# Patient Record
Sex: FEMALE | Race: WHITE | NOT HISPANIC OR LATINO | Employment: UNEMPLOYED | URBAN - NONMETROPOLITAN AREA
[De-identification: names, ages, dates, MRNs, and addresses within clinical notes are randomized per-mention and may not be internally consistent; named-entity substitution may affect disease eponyms.]

---

## 2023-08-16 ENCOUNTER — HOSPITAL ENCOUNTER (EMERGENCY)
Facility: HOSPITAL | Age: 48
Discharge: OTHER INSTITUTION | DRG: 897 | End: 2023-08-16
Attending: EMERGENCY MEDICINE
Payer: COMMERCIAL

## 2023-08-16 ENCOUNTER — HOSPITAL ENCOUNTER (INPATIENT)
Facility: HOSPITAL | Age: 48
LOS: 5 days | Discharge: HOME | DRG: 897 | End: 2023-08-21
Attending: PSYCHIATRY & NEUROLOGY | Admitting: PSYCHIATRY & NEUROLOGY
Payer: COMMERCIAL

## 2023-08-16 VITALS
RESPIRATION RATE: 18 BRPM | DIASTOLIC BLOOD PRESSURE: 97 MMHG | HEART RATE: 86 BPM | SYSTOLIC BLOOD PRESSURE: 149 MMHG | OXYGEN SATURATION: 100 % | TEMPERATURE: 97.8 F | HEIGHT: 64 IN | WEIGHT: 140 LBS | BODY MASS INDEX: 23.9 KG/M2

## 2023-08-16 DIAGNOSIS — F11.93 HEROIN WITHDRAWAL: ICD-10-CM

## 2023-08-16 DIAGNOSIS — I10 HYPERTENSION, UNSPECIFIED TYPE: ICD-10-CM

## 2023-08-16 DIAGNOSIS — F19.10 SUBSTANCE ABUSE: Primary | ICD-10-CM

## 2023-08-16 LAB
ALBUMIN SERPL-MCNC: 3.9 G/DL (ref 3.5–5.2)
ALBUMIN/GLOB SERPL: 1 G/DL
ALP SERPL-CCNC: 100 U/L (ref 39–117)
ALT SERPL W P-5'-P-CCNC: 21 U/L (ref 1–33)
AMPHET+METHAMPHET UR QL: NEGATIVE
AMPHETAMINES UR QL: POSITIVE
ANION GAP SERPL CALCULATED.3IONS-SCNC: 14.9 MMOL/L (ref 5–15)
AST SERPL-CCNC: 26 U/L (ref 1–32)
BACTERIA UR QL AUTO: ABNORMAL /HPF
BARBITURATES UR QL SCN: NEGATIVE
BASOPHILS # BLD AUTO: 0.09 10*3/MM3 (ref 0–0.2)
BASOPHILS NFR BLD AUTO: 0.8 % (ref 0–1.5)
BENZODIAZ UR QL SCN: NEGATIVE
BILIRUB SERPL-MCNC: 0.4 MG/DL (ref 0–1.2)
BILIRUB UR QL STRIP: NEGATIVE
BUN SERPL-MCNC: 39 MG/DL (ref 6–20)
BUN/CREAT SERPL: 26.7 (ref 7–25)
BUPRENORPHINE SERPL-MCNC: NEGATIVE NG/ML
CALCIUM SPEC-SCNC: 8.8 MG/DL (ref 8.6–10.5)
CANNABINOIDS SERPL QL: NEGATIVE
CHLORIDE SERPL-SCNC: 103 MMOL/L (ref 98–107)
CLARITY UR: ABNORMAL
CO2 SERPL-SCNC: 18.1 MMOL/L (ref 22–29)
COCAINE UR QL: NEGATIVE
COLOR UR: YELLOW
CREAT SERPL-MCNC: 1.46 MG/DL (ref 0.57–1)
DEPRECATED RDW RBC AUTO: 45 FL (ref 37–54)
EGFRCR SERPLBLD CKD-EPI 2021: 43.7 ML/MIN/1.73
EOSINOPHIL # BLD AUTO: 0.16 10*3/MM3 (ref 0–0.4)
EOSINOPHIL NFR BLD AUTO: 1.3 % (ref 0.3–6.2)
ERYTHROCYTE [DISTWIDTH] IN BLOOD BY AUTOMATED COUNT: 14.8 % (ref 12.3–15.4)
ETHANOL BLD-MCNC: <10 MG/DL (ref 0–10)
ETHANOL UR QL: <0.01 %
FENTANYL UR-MCNC: POSITIVE NG/ML
FLUAV RNA RESP QL NAA+PROBE: NOT DETECTED
FLUBV RNA RESP QL NAA+PROBE: NOT DETECTED
GLOBULIN UR ELPH-MCNC: 4 GM/DL
GLUCOSE SERPL-MCNC: 109 MG/DL (ref 65–99)
GLUCOSE UR STRIP-MCNC: ABNORMAL MG/DL
HCT VFR BLD AUTO: 34 % (ref 34–46.6)
HGB BLD-MCNC: 11 G/DL (ref 12–15.9)
HGB UR QL STRIP.AUTO: NEGATIVE
HOLD SPECIMEN: NORMAL
HOLD SPECIMEN: NORMAL
HYALINE CASTS UR QL AUTO: ABNORMAL /LPF
IMM GRANULOCYTES # BLD AUTO: 0.07 10*3/MM3 (ref 0–0.05)
IMM GRANULOCYTES NFR BLD AUTO: 0.6 % (ref 0–0.5)
KETONES UR QL STRIP: NEGATIVE
LEUKOCYTE ESTERASE UR QL STRIP.AUTO: ABNORMAL
LYMPHOCYTES # BLD AUTO: 2.81 10*3/MM3 (ref 0.7–3.1)
LYMPHOCYTES NFR BLD AUTO: 23.6 % (ref 19.6–45.3)
MAGNESIUM SERPL-MCNC: 2 MG/DL (ref 1.6–2.6)
MCH RBC QN AUTO: 27.2 PG (ref 26.6–33)
MCHC RBC AUTO-ENTMCNC: 32.4 G/DL (ref 31.5–35.7)
MCV RBC AUTO: 84.2 FL (ref 79–97)
METHADONE UR QL SCN: NEGATIVE
MONOCYTES # BLD AUTO: 0.57 10*3/MM3 (ref 0.1–0.9)
MONOCYTES NFR BLD AUTO: 4.8 % (ref 5–12)
NEUTROPHILS NFR BLD AUTO: 68.9 % (ref 42.7–76)
NEUTROPHILS NFR BLD AUTO: 8.23 10*3/MM3 (ref 1.7–7)
NITRITE UR QL STRIP: NEGATIVE
NRBC BLD AUTO-RTO: 0 /100 WBC (ref 0–0.2)
OPIATES UR QL: NEGATIVE
OXYCODONE UR QL SCN: NEGATIVE
PCP UR QL SCN: NEGATIVE
PH UR STRIP.AUTO: 6.5 [PH] (ref 5–8)
PLATELET # BLD AUTO: 309 10*3/MM3 (ref 140–450)
PMV BLD AUTO: 10 FL (ref 6–12)
POTASSIUM SERPL-SCNC: 3.7 MMOL/L (ref 3.5–5.2)
PROPOXYPH UR QL: NEGATIVE
PROT SERPL-MCNC: 7.9 G/DL (ref 6–8.5)
PROT UR QL STRIP: ABNORMAL
RBC # BLD AUTO: 4.04 10*6/MM3 (ref 3.77–5.28)
RBC # UR STRIP: ABNORMAL /HPF
REF LAB TEST METHOD: ABNORMAL
SARS-COV-2 RNA RESP QL NAA+PROBE: NOT DETECTED
SODIUM SERPL-SCNC: 136 MMOL/L (ref 136–145)
SP GR UR STRIP: 1.01 (ref 1–1.03)
SQUAMOUS #/AREA URNS HPF: ABNORMAL /HPF
TRICYCLICS UR QL SCN: NEGATIVE
UROBILINOGEN UR QL STRIP: ABNORMAL
WBC # UR STRIP: ABNORMAL /HPF
WBC NRBC COR # BLD: 11.93 10*3/MM3 (ref 3.4–10.8)
WHOLE BLOOD HOLD COAG: NORMAL
WHOLE BLOOD HOLD SPECIMEN: NORMAL

## 2023-08-16 PROCEDURE — 36415 COLL VENOUS BLD VENIPUNCTURE: CPT

## 2023-08-16 PROCEDURE — 83735 ASSAY OF MAGNESIUM: CPT | Performed by: PHYSICIAN ASSISTANT

## 2023-08-16 PROCEDURE — 80053 COMPREHEN METABOLIC PANEL: CPT | Performed by: PHYSICIAN ASSISTANT

## 2023-08-16 PROCEDURE — 85025 COMPLETE CBC W/AUTO DIFF WBC: CPT | Performed by: PHYSICIAN ASSISTANT

## 2023-08-16 PROCEDURE — 93010 ELECTROCARDIOGRAM REPORT: CPT | Performed by: INTERNAL MEDICINE

## 2023-08-16 PROCEDURE — 96374 THER/PROPH/DIAG INJ IV PUSH: CPT

## 2023-08-16 PROCEDURE — 96361 HYDRATE IV INFUSION ADD-ON: CPT

## 2023-08-16 PROCEDURE — 93005 ELECTROCARDIOGRAM TRACING: CPT | Performed by: PSYCHIATRY & NEUROLOGY

## 2023-08-16 PROCEDURE — 80074 ACUTE HEPATITIS PANEL: CPT | Performed by: PSYCHIATRY & NEUROLOGY

## 2023-08-16 PROCEDURE — 25810000003 SODIUM CHLORIDE 0.9 % SOLUTION: Performed by: PHYSICIAN ASSISTANT

## 2023-08-16 PROCEDURE — 80307 DRUG TEST PRSMV CHEM ANLYZR: CPT | Performed by: PHYSICIAN ASSISTANT

## 2023-08-16 PROCEDURE — 96375 TX/PRO/DX INJ NEW DRUG ADDON: CPT

## 2023-08-16 PROCEDURE — HZ2ZZZZ DETOXIFICATION SERVICES FOR SUBSTANCE ABUSE TREATMENT: ICD-10-PCS | Performed by: PSYCHIATRY & NEUROLOGY

## 2023-08-16 PROCEDURE — 82077 ASSAY SPEC XCP UR&BREATH IA: CPT | Performed by: PHYSICIAN ASSISTANT

## 2023-08-16 PROCEDURE — 63710000001 ONDANSETRON PER 8 MG: Performed by: PSYCHIATRY & NEUROLOGY

## 2023-08-16 PROCEDURE — 87636 SARSCOV2 & INF A&B AMP PRB: CPT | Performed by: PHYSICIAN ASSISTANT

## 2023-08-16 PROCEDURE — 81001 URINALYSIS AUTO W/SCOPE: CPT | Performed by: PHYSICIAN ASSISTANT

## 2023-08-16 PROCEDURE — 25010000002 HYDRALAZINE PER 20 MG: Performed by: PHYSICIAN ASSISTANT

## 2023-08-16 PROCEDURE — 99285 EMERGENCY DEPT VISIT HI MDM: CPT

## 2023-08-16 PROCEDURE — 25010000002 LABETALOL 5 MG/ML SOLUTION: Performed by: PHYSICIAN ASSISTANT

## 2023-08-16 RX ORDER — LOPERAMIDE HYDROCHLORIDE 2 MG/1
2 CAPSULE ORAL
Status: DISCONTINUED | OUTPATIENT
Start: 2023-08-16 | End: 2023-08-21 | Stop reason: HOSPADM

## 2023-08-16 RX ORDER — TRAZODONE HYDROCHLORIDE 50 MG/1
50 TABLET ORAL NIGHTLY PRN
Status: DISCONTINUED | OUTPATIENT
Start: 2023-08-16 | End: 2023-08-17

## 2023-08-16 RX ORDER — CLONIDINE HYDROCHLORIDE 0.1 MG/1
0.2 TABLET ORAL ONCE
Status: COMPLETED | OUTPATIENT
Start: 2023-08-16 | End: 2023-08-16

## 2023-08-16 RX ORDER — FAMOTIDINE 20 MG/1
20 TABLET, FILM COATED ORAL 2 TIMES DAILY PRN
Status: DISCONTINUED | OUTPATIENT
Start: 2023-08-16 | End: 2023-08-21 | Stop reason: HOSPADM

## 2023-08-16 RX ORDER — HYDRALAZINE HYDROCHLORIDE 20 MG/ML
10 INJECTION INTRAMUSCULAR; INTRAVENOUS ONCE
Status: COMPLETED | OUTPATIENT
Start: 2023-08-16 | End: 2023-08-16

## 2023-08-16 RX ORDER — ONDANSETRON 4 MG/1
4 TABLET, FILM COATED ORAL EVERY 6 HOURS PRN
Status: DISCONTINUED | OUTPATIENT
Start: 2023-08-16 | End: 2023-08-17

## 2023-08-16 RX ORDER — LABETALOL HYDROCHLORIDE 5 MG/ML
10 INJECTION, SOLUTION INTRAVENOUS ONCE
Status: COMPLETED | OUTPATIENT
Start: 2023-08-16 | End: 2023-08-16

## 2023-08-16 RX ORDER — CLONIDINE HYDROCHLORIDE 0.1 MG/1
0.1 TABLET ORAL 3 TIMES DAILY PRN
Status: DISPENSED | OUTPATIENT
Start: 2023-08-18 | End: 2023-08-19

## 2023-08-16 RX ORDER — SODIUM CHLORIDE 0.9 % (FLUSH) 0.9 %
10 SYRINGE (ML) INJECTION AS NEEDED
Status: DISCONTINUED | OUTPATIENT
Start: 2023-08-16 | End: 2023-08-16 | Stop reason: HOSPADM

## 2023-08-16 RX ORDER — ALUMINA, MAGNESIA, AND SIMETHICONE 2400; 2400; 240 MG/30ML; MG/30ML; MG/30ML
15 SUSPENSION ORAL EVERY 6 HOURS PRN
Status: DISCONTINUED | OUTPATIENT
Start: 2023-08-16 | End: 2023-08-21 | Stop reason: HOSPADM

## 2023-08-16 RX ORDER — BENZTROPINE MESYLATE 1 MG/1
2 TABLET ORAL ONCE AS NEEDED
Status: DISCONTINUED | OUTPATIENT
Start: 2023-08-16 | End: 2023-08-21 | Stop reason: HOSPADM

## 2023-08-16 RX ORDER — ACETAMINOPHEN 325 MG/1
650 TABLET ORAL EVERY 6 HOURS PRN
Status: DISCONTINUED | OUTPATIENT
Start: 2023-08-16 | End: 2023-08-21 | Stop reason: HOSPADM

## 2023-08-16 RX ORDER — HYDRALAZINE HYDROCHLORIDE 25 MG/1
25 TABLET, FILM COATED ORAL DAILY PRN
Status: DISCONTINUED | OUTPATIENT
Start: 2023-08-16 | End: 2023-08-21 | Stop reason: HOSPADM

## 2023-08-16 RX ORDER — ECHINACEA PURPUREA EXTRACT 125 MG
2 TABLET ORAL AS NEEDED
Status: DISCONTINUED | OUTPATIENT
Start: 2023-08-16 | End: 2023-08-21 | Stop reason: HOSPADM

## 2023-08-16 RX ORDER — CLONIDINE HYDROCHLORIDE 0.1 MG/1
0.1 TABLET ORAL 4 TIMES DAILY PRN
Status: DISPENSED | OUTPATIENT
Start: 2023-08-17 | End: 2023-08-18

## 2023-08-16 RX ORDER — CLONIDINE HYDROCHLORIDE 0.1 MG/1
0.1 TABLET ORAL 4 TIMES DAILY PRN
Status: DISPENSED | OUTPATIENT
Start: 2023-08-16 | End: 2023-08-17

## 2023-08-16 RX ORDER — BENZTROPINE MESYLATE 1 MG/ML
1 INJECTION INTRAMUSCULAR; INTRAVENOUS ONCE AS NEEDED
Status: DISCONTINUED | OUTPATIENT
Start: 2023-08-16 | End: 2023-08-21 | Stop reason: HOSPADM

## 2023-08-16 RX ORDER — HYDROXYZINE 50 MG/1
50 TABLET, FILM COATED ORAL EVERY 6 HOURS PRN
Status: DISCONTINUED | OUTPATIENT
Start: 2023-08-16 | End: 2023-08-17

## 2023-08-16 RX ORDER — ROPINIROLE 0.25 MG/1
0.5 TABLET, FILM COATED ORAL ONCE
Status: COMPLETED | OUTPATIENT
Start: 2023-08-16 | End: 2023-08-16

## 2023-08-16 RX ORDER — DICYCLOMINE HYDROCHLORIDE 10 MG/1
10 CAPSULE ORAL 3 TIMES DAILY PRN
Status: DISCONTINUED | OUTPATIENT
Start: 2023-08-16 | End: 2023-08-21 | Stop reason: HOSPADM

## 2023-08-16 RX ORDER — CLONIDINE HYDROCHLORIDE 0.1 MG/1
0.1 TABLET ORAL ONCE AS NEEDED
Status: ACTIVE | OUTPATIENT
Start: 2023-08-20 | End: 2023-08-21

## 2023-08-16 RX ORDER — IBUPROFEN 400 MG/1
400 TABLET ORAL EVERY 6 HOURS PRN
Status: DISCONTINUED | OUTPATIENT
Start: 2023-08-16 | End: 2023-08-16

## 2023-08-16 RX ORDER — CYCLOBENZAPRINE HCL 10 MG
10 TABLET ORAL 3 TIMES DAILY PRN
Status: DISCONTINUED | OUTPATIENT
Start: 2023-08-16 | End: 2023-08-21 | Stop reason: HOSPADM

## 2023-08-16 RX ORDER — BENZONATATE 100 MG/1
100 CAPSULE ORAL 3 TIMES DAILY PRN
Status: DISCONTINUED | OUTPATIENT
Start: 2023-08-16 | End: 2023-08-21 | Stop reason: HOSPADM

## 2023-08-16 RX ORDER — CLONIDINE HYDROCHLORIDE 0.1 MG/1
0.1 TABLET ORAL 2 TIMES DAILY PRN
Status: DISPENSED | OUTPATIENT
Start: 2023-08-19 | End: 2023-08-20

## 2023-08-16 RX ADMIN — CLONIDINE HYDROCHLORIDE 0.1 MG: 0.1 TABLET ORAL at 17:09

## 2023-08-16 RX ADMIN — SODIUM CHLORIDE 2000 ML: 9 INJECTION, SOLUTION INTRAVENOUS at 11:51

## 2023-08-16 RX ADMIN — HYDROXYZINE HYDROCHLORIDE 50 MG: 50 TABLET ORAL at 17:10

## 2023-08-16 RX ADMIN — ACETAMINOPHEN 650 MG: 325 TABLET ORAL at 21:01

## 2023-08-16 RX ADMIN — ROPINIROLE HYDROCHLORIDE 0.5 MG: 0.25 TABLET, FILM COATED ORAL at 21:17

## 2023-08-16 RX ADMIN — HYDRALAZINE HYDROCHLORIDE 25 MG: 25 TABLET, FILM COATED ORAL at 19:02

## 2023-08-16 RX ADMIN — CYCLOBENZAPRINE 10 MG: 10 TABLET, FILM COATED ORAL at 17:09

## 2023-08-16 RX ADMIN — CLONIDINE HYDROCHLORIDE 0.2 MG: 0.1 TABLET ORAL at 10:34

## 2023-08-16 RX ADMIN — LABETALOL HYDROCHLORIDE 10 MG: 5 INJECTION, SOLUTION INTRAVENOUS at 14:34

## 2023-08-16 RX ADMIN — HYDRALAZINE HYDROCHLORIDE 10 MG: 20 INJECTION INTRAMUSCULAR; INTRAVENOUS at 13:16

## 2023-08-16 RX ADMIN — ONDANSETRON HYDROCHLORIDE 4 MG: 4 TABLET, FILM COATED ORAL at 17:10

## 2023-08-16 NOTE — NURSING NOTE
Pt presents to intake requesting help to detox from heroin.  Pt reports taking 3-4 points daily for the last 3 years, snort or IV, but reports a long history of opiate use, moving from oxycontin, to opana, and eventually heroin. Last use yesterday. Rates current craving 10/10. Current COWS score 11.   Pt denies SI/HI/AVH  Clonidine administered per PA order for HTN and withdrawal symptoms.

## 2023-08-16 NOTE — NURSING NOTE
Patient reports she is here to detox from heroin. Reports using 3 points a day IV for the past 3 years, last use reported as 2 days ago. Reports she sometimes snorts but mainly IV use. Reports she has had consistant opiate use for the past 15-20 years. Reports no real period of sobriety. Patient reports she plans to go to rehab at [facility] on [place]. Nikienlty denies any other substance use. Denies SI HI or AVH. Reports anxiety and depression both 10/10/ Craving 10/10. Reports good appetite and poor sleep. Reports medical history of COPD and prolapsed rectum. Reports she has has a prolapsed rectum for the past 2 years due to methadone use causing constipation In the past. Reports incontinent mainly at night.

## 2023-08-16 NOTE — NURSING NOTE
Attempted to call Dr. DREA Bustillo regarding /110 P 91; no answer. Gave PRN hydralazine 25mg and sent provider text message. Will continue to monitor.

## 2023-08-16 NOTE — NURSING NOTE
Pt arrived to intake. Search complete with 2 staff members present. Belongings taken, placed in labeled bag, and placed in cabinet for safety. Paper scrubs given and pt is wearing at this time. Urine and covid swab obtained and sent to lab.

## 2023-08-16 NOTE — NURSING NOTE
Pt medically clear and return to intake. Spoke with Dr. Covington regarding pt assessment and history. Orders received to admit to detox, routine orders sp4 with clonidine detox.  Maria D Shelby RN notified. bed 46A assigned

## 2023-08-16 NOTE — ED PROVIDER NOTES
Subjective   History of Present Illness  48-year-old female who presents to the ED today for detox evaluation.  She reports that she needs detox from heroin.  She states her last use was about 3 days ago.  She states she also uses methamphetamine.  She occasionally drinks alcohol but denies any recent use.  She denies any suicidal ideations.  She states a couple days ago her mom gave her a nerve pill to try to help with her withdrawal symptoms.  She states currently she feels anxious and has not been sleeping.  She states she is having palpitations, abdominal pain, fatigue and her legs hurt.  She states she is not currently on any medications but she is supposed to take medication for her blood pressure.  She states she has been off of this for quite a while.    History provided by:  Patient  Drug / Alcohol Assessment  Primary symptoms comment: requesting detox. This is a new problem. The current episode started 2 days ago. The problem has been gradually worsening. Suspected agents include heroin and methamphetamines. Pertinent negatives include no nausea and no vomiting. Associated medical issues include addiction treatment.     Review of Systems   Constitutional:  Positive for fatigue.   HENT: Negative.     Eyes: Negative.    Respiratory: Negative.     Cardiovascular:  Positive for palpitations.   Gastrointestinal:  Positive for abdominal pain. Negative for nausea and vomiting.   Genitourinary: Negative.    Musculoskeletal:  Positive for myalgias.   Skin: Negative.    Neurological: Negative.    Psychiatric/Behavioral:  Positive for sleep disturbance. The patient is nervous/anxious.    All other systems reviewed and are negative.    No past medical history on file.    No Known Allergies    No past surgical history on file.    No family history on file.    Social History     Socioeconomic History    Marital status: Legally            Objective   Physical Exam  Vitals and nursing note reviewed.    Constitutional:       General: She is not in acute distress.     Appearance: Normal appearance. She is not diaphoretic.   HENT:      Head: Normocephalic and atraumatic.      Right Ear: External ear normal.      Left Ear: External ear normal.      Nose: Nose normal.   Eyes:      Conjunctiva/sclera: Conjunctivae normal.      Pupils: Pupils are equal, round, and reactive to light.   Cardiovascular:      Rate and Rhythm: Normal rate and regular rhythm.      Pulses: Normal pulses.      Heart sounds: Normal heart sounds.   Pulmonary:      Effort: Pulmonary effort is normal.      Breath sounds: Normal breath sounds.   Abdominal:      General: Bowel sounds are normal.      Palpations: Abdomen is soft.   Musculoskeletal:         General: Normal range of motion.      Cervical back: Normal range of motion and neck supple.   Skin:     General: Skin is warm and dry.      Capillary Refill: Capillary refill takes less than 2 seconds.   Neurological:      General: No focal deficit present.      Mental Status: She is alert and oriented to person, place, and time.   Psychiatric:         Mood and Affect: Mood is anxious.         Thought Content: Thought content does not include homicidal or suicidal ideation.       Procedures       Results for orders placed or performed during the hospital encounter of 08/16/23   COVID-19 and FLU A/B PCR - Swab, Nasopharynx    Specimen: Nasopharynx; Swab   Result Value Ref Range    COVID19 Not Detected Not Detected - Ref. Range    Influenza A PCR Not Detected Not Detected    Influenza B PCR Not Detected Not Detected   Comprehensive Metabolic Panel    Specimen: Arm, Left; Blood   Result Value Ref Range    Glucose 109 (H) 65 - 99 mg/dL    BUN 39 (H) 6 - 20 mg/dL    Creatinine 1.46 (H) 0.57 - 1.00 mg/dL    Sodium 136 136 - 145 mmol/L    Potassium 3.7 3.5 - 5.2 mmol/L    Chloride 103 98 - 107 mmol/L    CO2 18.1 (L) 22.0 - 29.0 mmol/L    Calcium 8.8 8.6 - 10.5 mg/dL    Total Protein 7.9 6.0 - 8.5 g/dL     Albumin 3.9 3.5 - 5.2 g/dL    ALT (SGPT) 21 1 - 33 U/L    AST (SGOT) 26 1 - 32 U/L    Alkaline Phosphatase 100 39 - 117 U/L    Total Bilirubin 0.4 0.0 - 1.2 mg/dL    Globulin 4.0 gm/dL    A/G Ratio 1.0 g/dL    BUN/Creatinine Ratio 26.7 (H) 7.0 - 25.0    Anion Gap 14.9 5.0 - 15.0 mmol/L    eGFR 43.7 (L) >60.0 mL/min/1.73   Urinalysis With Microscopic If Indicated (No Culture) - Urine, Clean Catch    Specimen: Urine, Clean Catch   Result Value Ref Range    Color, UA Yellow Yellow, Straw    Appearance, UA Cloudy (A) Clear    pH, UA 6.5 5.0 - 8.0    Specific Gravity, UA 1.006 1.005 - 1.030    Glucose,  mg/dL (1+) (A) Negative    Ketones, UA Negative Negative    Bilirubin, UA Negative Negative    Blood, UA Negative Negative    Protein, UA Trace (A) Negative    Leuk Esterase, UA Trace (A) Negative    Nitrite, UA Negative Negative    Urobilinogen, UA 0.2 E.U./dL 0.2 - 1.0 E.U./dL   Ethanol    Specimen: Arm, Left; Blood   Result Value Ref Range    Ethanol <10 0 - 10 mg/dL    Ethanol % <0.010 %   Urine Drug Screen - Urine, Clean Catch    Specimen: Urine, Clean Catch   Result Value Ref Range    THC, Screen, Urine Negative Negative    Phencyclidine (PCP), Urine Negative Negative    Cocaine Screen, Urine Negative Negative    Methamphetamine, Ur Positive (A) Negative    Opiate Screen Negative Negative    Amphetamine Screen, Urine Negative Negative    Benzodiazepine Screen, Urine Negative Negative    Tricyclic Antidepressants Screen Negative Negative    Methadone Screen, Urine Negative Negative    Barbiturates Screen, Urine Negative Negative    Oxycodone Screen, Urine Negative Negative    Propoxyphene Screen Negative Negative    Buprenorphine, Screen, Urine Negative Negative   Magnesium    Specimen: Arm, Left; Blood   Result Value Ref Range    Magnesium 2.0 1.6 - 2.6 mg/dL   CBC Auto Differential    Specimen: Arm, Left; Blood   Result Value Ref Range    WBC 11.93 (H) 3.40 - 10.80 10*3/mm3    RBC 4.04 3.77 - 5.28 10*6/mm3     Hemoglobin 11.0 (L) 12.0 - 15.9 g/dL    Hematocrit 34.0 34.0 - 46.6 %    MCV 84.2 79.0 - 97.0 fL    MCH 27.2 26.6 - 33.0 pg    MCHC 32.4 31.5 - 35.7 g/dL    RDW 14.8 12.3 - 15.4 %    RDW-SD 45.0 37.0 - 54.0 fl    MPV 10.0 6.0 - 12.0 fL    Platelets 309 140 - 450 10*3/mm3    Neutrophil % 68.9 42.7 - 76.0 %    Lymphocyte % 23.6 19.6 - 45.3 %    Monocyte % 4.8 (L) 5.0 - 12.0 %    Eosinophil % 1.3 0.3 - 6.2 %    Basophil % 0.8 0.0 - 1.5 %    Immature Grans % 0.6 (H) 0.0 - 0.5 %    Neutrophils, Absolute 8.23 (H) 1.70 - 7.00 10*3/mm3    Lymphocytes, Absolute 2.81 0.70 - 3.10 10*3/mm3    Monocytes, Absolute 0.57 0.10 - 0.90 10*3/mm3    Eosinophils, Absolute 0.16 0.00 - 0.40 10*3/mm3    Basophils, Absolute 0.09 0.00 - 0.20 10*3/mm3    Immature Grans, Absolute 0.07 (H) 0.00 - 0.05 10*3/mm3    nRBC 0.0 0.0 - 0.2 /100 WBC   Fentanyl, Urine - Urine, Clean Catch    Specimen: Urine, Clean Catch   Result Value Ref Range    Fentanyl, Urine Positive (A) Negative   Urinalysis, Microscopic Only - Urine, Clean Catch    Specimen: Urine, Clean Catch   Result Value Ref Range    RBC, UA 0-2 None Seen, 0-2 /HPF    WBC, UA 3-5 (A) None Seen, 0-2 /HPF    Bacteria, UA None Seen None Seen /HPF    Squamous Epithelial Cells, UA 0-2 None Seen, 0-2 /HPF    Hyaline Casts, UA None Seen None Seen /LPF    Methodology Automated Microscopy    Green Top (Gel)   Result Value Ref Range    Extra Tube Hold for add-ons.    Lavender Top   Result Value Ref Range    Extra Tube hold for add-on    Gold Top - SST   Result Value Ref Range    Extra Tube Hold for add-ons.    Light Blue Top   Result Value Ref Range    Extra Tube Hold for add-ons.           ED Course  ED Course as of 08/16/23 1638   Wed Aug 16, 2023   1521 Medically clear for detox [AH]      ED Course User Index  [AH] Carin Vogel, PA                                           Medical Decision Making  48-year-old female who presents to the ED today for detox evaluation.  She reports taking detox from  heroin.  She is having active withdrawal symptoms.  Her blood pressure was elevated during her ED stay.  She received several different medications prior to getting her blood pressure to a more acceptable level.  She also had some elevation of her BUN and creatinine.  She received IV fluids to help with this.  She was medically cleared for a detox evaluation.  Psychiatry was consulted who recommended inpatient admission.    Problems Addressed:  Heroin withdrawal: complicated acute illness or injury  Hypertension, unspecified type: complicated acute illness or injury  Substance abuse: complicated acute illness or injury    Amount and/or Complexity of Data Reviewed  Labs: ordered.    Risk  Prescription drug management.  Decision regarding hospitalization.        Final diagnoses:   Substance abuse   Heroin withdrawal   Hypertension, unspecified type       ED Disposition  ED Disposition       ED Disposition   DC/Transfer to Behavioral Health Condition   Stable    Comment   --               No follow-up provider specified.       Medication List      No changes were made to your prescriptions during this visit.            Carin Vogel PA  08/16/23 4853

## 2023-08-16 NOTE — PLAN OF CARE
Problem: Adult Behavioral Health Plan of Care  Goal: Plan of Care Review  Outcome: Ongoing, Progressing  Flowsheets (Taken 8/16/2023 2601)  Progress: no change  Plan of Care Reviewed With: patient  Patient Agreement with Plan of Care: agrees  Outcome Evaluation: new admit   Goal Outcome Evaluation:  Plan of Care Reviewed With: patient  Patient Agreement with Plan of Care: agrees     Progress: no change  Outcome Evaluation: new admit

## 2023-08-17 PROBLEM — F15.20 METHAMPHETAMINE USE DISORDER, SEVERE, DEPENDENCE: Status: ACTIVE | Noted: 2023-08-17

## 2023-08-17 PROBLEM — I10 HTN (HYPERTENSION): Status: ACTIVE | Noted: 2023-08-17

## 2023-08-17 PROBLEM — F17.200 NICOTINE USE DISORDER: Status: ACTIVE | Noted: 2023-08-17

## 2023-08-17 PROBLEM — F11.20 OPIOID USE DISORDER, SEVERE, DEPENDENCE: Status: ACTIVE | Noted: 2023-08-17

## 2023-08-17 LAB
ALBUMIN SERPL-MCNC: 3.6 G/DL (ref 3.5–5.2)
ALBUMIN/GLOB SERPL: 0.9 G/DL
ALP SERPL-CCNC: 96 U/L (ref 39–117)
ALT SERPL W P-5'-P-CCNC: 20 U/L (ref 1–33)
ANION GAP SERPL CALCULATED.3IONS-SCNC: 8.1 MMOL/L (ref 5–15)
AST SERPL-CCNC: 27 U/L (ref 1–32)
BILIRUB SERPL-MCNC: 0.3 MG/DL (ref 0–1.2)
BUN SERPL-MCNC: 32 MG/DL (ref 6–20)
BUN/CREAT SERPL: 24.1 (ref 7–25)
CALCIUM SPEC-SCNC: 8.8 MG/DL (ref 8.6–10.5)
CHLORIDE SERPL-SCNC: 106 MMOL/L (ref 98–107)
CO2 SERPL-SCNC: 20.9 MMOL/L (ref 22–29)
CREAT SERPL-MCNC: 1.33 MG/DL (ref 0.57–1)
EGFRCR SERPLBLD CKD-EPI 2021: 48.8 ML/MIN/1.73
GLOBULIN UR ELPH-MCNC: 3.9 GM/DL
GLUCOSE SERPL-MCNC: 102 MG/DL (ref 65–99)
POTASSIUM SERPL-SCNC: 4.6 MMOL/L (ref 3.5–5.2)
PROT SERPL-MCNC: 7.5 G/DL (ref 6–8.5)
QT INTERVAL: 422 MS
QTC INTERVAL: 516 MS
SODIUM SERPL-SCNC: 135 MMOL/L (ref 136–145)

## 2023-08-17 PROCEDURE — 80053 COMPREHEN METABOLIC PANEL: CPT | Performed by: PSYCHIATRY & NEUROLOGY

## 2023-08-17 PROCEDURE — 99223 1ST HOSP IP/OBS HIGH 75: CPT | Performed by: PSYCHIATRY & NEUROLOGY

## 2023-08-17 PROCEDURE — 63710000001 ONDANSETRON PER 8 MG: Performed by: PSYCHIATRY & NEUROLOGY

## 2023-08-17 RX ORDER — AMLODIPINE BESYLATE 5 MG/1
5 TABLET ORAL
Status: DISCONTINUED | OUTPATIENT
Start: 2023-08-17 | End: 2023-08-18

## 2023-08-17 RX ORDER — IPRATROPIUM BROMIDE AND ALBUTEROL SULFATE 2.5; .5 MG/3ML; MG/3ML
3 SOLUTION RESPIRATORY (INHALATION) EVERY 4 HOURS PRN
Status: DISCONTINUED | OUTPATIENT
Start: 2023-08-17 | End: 2023-08-21

## 2023-08-17 RX ADMIN — CYCLOBENZAPRINE 10 MG: 10 TABLET, FILM COATED ORAL at 22:32

## 2023-08-17 RX ADMIN — CLONIDINE HYDROCHLORIDE 0.1 MG: 0.1 TABLET ORAL at 20:59

## 2023-08-17 RX ADMIN — CLONIDINE HYDROCHLORIDE 0.1 MG: 0.1 TABLET ORAL at 08:09

## 2023-08-17 RX ADMIN — CLONIDINE HYDROCHLORIDE 0.1 MG: 0.1 TABLET ORAL at 00:15

## 2023-08-17 RX ADMIN — CLONIDINE HYDROCHLORIDE 0.1 MG: 0.1 TABLET ORAL at 14:50

## 2023-08-17 RX ADMIN — LOPERAMIDE HYDROCHLORIDE 2 MG: 2 CAPSULE ORAL at 14:50

## 2023-08-17 RX ADMIN — LOPERAMIDE HYDROCHLORIDE 2 MG: 2 CAPSULE ORAL at 08:10

## 2023-08-17 RX ADMIN — ACETAMINOPHEN 650 MG: 325 TABLET ORAL at 20:59

## 2023-08-17 RX ADMIN — CYCLOBENZAPRINE 10 MG: 10 TABLET, FILM COATED ORAL at 08:10

## 2023-08-17 RX ADMIN — HYDRALAZINE HYDROCHLORIDE 25 MG: 25 TABLET, FILM COATED ORAL at 11:42

## 2023-08-17 RX ADMIN — ACETAMINOPHEN 650 MG: 325 TABLET ORAL at 14:52

## 2023-08-17 RX ADMIN — AMLODIPINE BESYLATE 5 MG: 5 TABLET ORAL at 17:04

## 2023-08-17 RX ADMIN — ONDANSETRON HYDROCHLORIDE 4 MG: 4 TABLET, FILM COATED ORAL at 08:10

## 2023-08-17 RX ADMIN — HYDROXYZINE HYDROCHLORIDE 50 MG: 50 TABLET ORAL at 08:10

## 2023-08-17 RX ADMIN — METOPROLOL TARTRATE 50 MG: 25 TABLET, FILM COATED ORAL at 22:32

## 2023-08-17 NOTE — NURSING NOTE
Dr. Stewart made aware of pts /128. No new orders at this time, med nurse medicated pt for elevated BP. Pt denies any issues at this time.

## 2023-08-17 NOTE — PROGRESS NOTES
Navigator is helping with the following referral:    [facility] - [phone]  -Received call from [facility] checking on patient. Attempted to return call but had to leave message with Aleksandr.  8/17

## 2023-08-17 NOTE — PLAN OF CARE
Goal Outcome Evaluation:        Problem: Adult Behavioral Health Plan of Care  Goal: Patient-Specific Goal (Individualization)  Outcome: Ongoing, Progressing  Flowsheets  Taken 8/17/2023 1040  Patient-Specific Goals (Include Timeframe): Identify 2-3 coping skills, address relapse prevention methods, complete aftercare plans, and deny SI/Hi prior to discharge.  Individualized Care Needs: Therapist to offer 1-4 therapy sessions, aftercare planning, safety planning, family education, group therapy, and brief CBT/MI interventions.  Anxieties, Fears or Concerns: none verbalized  Taken 8/17/2023 1028  Patient Personal Strengths:   resilient   resourceful   motivated for treatment   motivated for recovery  Patient Vulnerabilities:   substance abuse/addiction   poor impulse control   poor physical health   occupational insecurity  Goal: Optimized Coping Skills in Response to Life Stressors  Outcome: Ongoing, Progressing  Flowsheets (Taken 8/17/2023 1040)  Optimized Coping Skills in Response to Life Stressors: making progress toward outcome  Intervention: Promote Effective Coping Strategies  Flowsheets (Taken 8/17/2023 1040)  Supportive Measures:   active listening utilized   counseling provided   decision-making supported   goal-setting facilitated   self-responsibility promoted   verbalization of feelings encouraged   positive reinforcement provided   self-reflection promoted   self-care encouraged  Goal: Develops/Participates in Therapeutic Miller to Support Successful Transition  Outcome: Ongoing, Progressing  Flowsheets (Taken 8/17/2023 1040)  Develops/Participates in Therapeutic Miller to Support Successful Transition: making progress toward outcome  Intervention: Foster Therapeutic Miller  Flowsheets (Taken 8/17/2023 1040)  Trust Relationship/Rapport:   care explained   questions encouraged   choices provided   reassurance provided   emotional support provided   thoughts/feelings acknowledged   empathic  listening provided   questions answered  Intervention: Mutually Develop Transition Plan  Flowsheets  Taken 8/17/2023 1040  Outpatient/Agency/Support Group Needs: residential services  Transition Support:   follow-up care discussed   follow-up care coordinated   community resources reviewed   crisis management plan promoted   crisis management plan verbalized  Anticipated Discharge Disposition: residential substance use unit  Taken 8/17/2023 1038  Discharge Coordination/Progress: Patient does not have insurance coverage. Therapist met with patient to complete assessment, patient agreeable.  Transportation Anticipated: agency  Transportation Concerns: none  Current Discharge Risk: substance use/abuse  Concerns to be Addressed:   substance/tobacco abuse/use   cognitive/perceptual   coping/stress   mental health   discharge planning  Readmission Within the Last 30 Days: no previous admission in last 30 days  Patient/Family Anticipated Services at Transition:   mental health services   rehabilitation services  Patient's Choice of Community Agency(s): [facility]  Patient/Family Anticipates Transition to: inpatient rehabilitation facility  Offered/Gave Vendor List: no         DATA:      Therapist met individually with patient this date to introduce role and to discuss hospitalization expectations. Patient agreeable.     Patient signed consent for [email or web address] [employer].     Clinical Maneuvering/Intervention:     Therapist assisted patient in processing above session content; acknowledged and normalized patient’s thoughts, feelings, and concerns.  Discussed the therapist/patient relationship and explain the parameters and limitations of relative confidentiality.  Also discussed the importance of active participation, and honesty to the treatment process.  Encouraged the patient to discuss/vent their feelings, frustrations, and fears concerning their ongoing medical issues and validated their feelings.      Discussed the importance of finding enjoyable activities and coping skills that the patient can engage in a regular basis. Discussed healthy coping skills such as distraction, self love, grounding, thought challenges/reframing, etc.  Provided patient with list of healthy coping skills this date. Discussed the importance of medication compliance.  Praised the patient for seeking help and spent the majority of the session building rapport.       Allowed patient to freely discuss issues without interruption or judgment. Provided safe, confidential environment to facilitate the development of positive therapeutic relationship and encourage open, honest communication.      Therapist addressed discharge safety planning this date. Assisted patient in identifying risk factors which would indicate the need for higher level of care after discharge;  including thoughts to harm self or others and/or self-harming behavior. Encouraged patient to call 911, or present to the nearest emergency room should any of these events occur. Discussed crisis intervention services and means to access.  Encouraged securing any objects of harm.       Therapist completed integrated summary, treatment plan, and initiated social history this date.  Therapist is strongly encouraging family involvement in treatment.       ASSESSMENT:      The patient is a 47 y/o  female admitted for detox treatment. Therapist met with patient on this date to complete assessment. Patient reports high anxiety and depression, denies current SI/HI/AVH. Patient reports experiencing weakness, leg cramps, restlessness, chills, and sweats. Patient reports first use at 13 y/o and has no significant periods of sobriety. Patient reports use of heroin, methamphetamine, fentanyl, and xanax. No past [facility] admissions. Patient is currently homeless. Patient was brought in by [facility] and plans to return at discharge, agency to provide  transportation pending acceptance. Patient did not consent to family involvement in treatment at this time.       PLAN:       Patient to remain hospitalized this date.     Treatment team will focus efforts on stabilizing patient's acute symptoms while providing education on healthy coping and crisis management to reduce hospitalizations.   Patient requires daily psychiatrist evaluation and 24/7 nursing supervision to promote patient  safety.     Therapist will offer 1-4 individual sessions, 1 therapy group daily, family education, and appropriate referral.    Therapist recommends [facility] residential rehab.

## 2023-08-17 NOTE — NURSING NOTE
Dr. Bustillo contacted related to patient having increased muscle jerking and restless legs.     New order given:    Reqip 0.5mg PO once and report off to day for continuity of care.

## 2023-08-17 NOTE — H&P
INITIAL PSYCHIATRIC HISTORY & PHYSICAL    Patient Identification:  Name:   Dakota Holloway  Age:   48 y.o.  Sex:   female  :   1975  MRN:   [id number]  Visit Number:   [id number]  Primary Care Physician:   Provider, No Known    SUBJECTIVE    CC/Focus of Exam: Detox    HPI: Dakota Holloway is a 48 y.o. female who was admitted on 2023 with complaints of drug use and withdrawals. The patient reports a long history of substance use. First use was 13 years old. Over time the use increased and the patient  continued to use despite negative consequences. The patient endorses symptoms of tolerance and withdrawals. Has tried to cut down and stop but has not been successful. Spends too much time and resources in pursuit of substance use. Longest period of sobriety is reported to be never.  Currently using heroin, meth, fentanyl, Xanax, marijuana  Last use 08-  Withdrawal symptoms restlessness, chills, sweats  Patient denies any alcohol abuse.  Patient states that she uses tobacco.  Patient denies any history of seizures with withdrawal.  Patient denies any stressors in her life.  Patient denies any history of mental ,physical, or sexual abuse.  Patient rates her appetite as poor.  Patient rates her sleep as poor.  Patient denies any nightmares.  Patient rates her anxiety on a scale of 1-10 with 10 being the most severe a 10.  Patient rates her depression on a scale of 1-10 with 10 being the most severe a 10.  Patient rates her cravings on a scale of 1-10 with 10 being the most severe a 10.  Patient's COWS was 11.  Patient denies any suicidal ideation.  Patient denies any homicidal ideation.  Patient denies any hallucinations.  Patient was admitted to [facility] psychiatry for further safety and stabilization.    Available medical/psychiatric records reviewed and incorporated into the current document.     PAST PSYCHIATRIC HX: Patient has had no prior admissions.  Patient denies any outpatient  care.    SUBSTANCE USE HX: UDS was positive for fentanyl and methamphetamine.  See HPI for current use.    SOCIAL HX: Patient states she was born and raised in [place], [place].  Patient states that she is currently homeless.  Patient states that she is  but states she is currently  at this time.  Patient states she has no children.  Patient states that she is currently unemployed.  Patient states she has 1/11 grade education.  Patient denies any legal issues.    Past Medical History:   Diagnosis Date    Peripheral neuropathy     Withdrawal symptoms, drug or narcotic        History reviewed. No pertinent surgical history.    History reviewed. No pertinent family history.      No medications prior to admission.           ALLERGIES:  Patient has no known allergies.    Temp:  [96.4 °F (35.8 °C)-97.8 °F (36.6 °C)] 97.4 °F (36.3 °C)  Heart Rate:  [] 113  Resp:  [16-18] 18  BP: (144-188)/() 153/101    REVIEW OF SYSTEMS:  Review of Systems   Constitutional:  Positive for chills, diaphoresis and fatigue.   HENT:  Positive for sneezing.    Eyes: Negative.    Respiratory: Negative.     Cardiovascular: Negative.    Gastrointestinal:  Positive for abdominal pain.   Endocrine: Negative.    Genitourinary: Negative.    Musculoskeletal:  Positive for myalgias.   Skin: Negative.    Allergic/Immunologic: Negative.    Neurological:  Positive for weakness.   Psychiatric/Behavioral:  Positive for dysphoric mood. The patient is nervous/anxious.     See HPI for psychiatric ROS  OBJECTIVE    PHYSICAL EXAM:  Physical Exam  Constitutional:  Appears well-developed and well-nourished.   HENT:   Head: Normocephalic and atraumatic.   Right Ear: External ear normal.   Left Ear: External ear normal.   Mouth/Throat: Oropharynx is clear and moist.   Eyes: Pupils are equal, round, and reactive to light. Conjunctivae and EOM are normal.   Neck: Normal range of motion. Neck supple.   Cardiovascular: Normal rate,  regular rhythm and normal heart sounds.    Respiratory: Effort normal and breath sounds normal. No respiratory distress. No wheezes.   GI: Soft. Bowel sounds are normal.No distension. There is no tenderness.   Musculoskeletal: Normal range of motion. No edema or deformity.   Neurological:No cranial nerve deficit. Coordination normal.   Skin: Skin is warm and dry. No rash noted. No erythema. Track marks noted both forearms.    MENTAL STATUS EXAM:   Hygiene:   fair  Cooperation:  Cooperative  Eye Contact:  Good  Psychomotor Behavior:  Aggitated  Affect:  Appropriate  Hopelessness: 5  Speech:  Normal  Linear  Thought Content:  Normal  Suicidal:  None  Homicidal:  None  Hallucinations:  None  Delusion:  None  Memory:  Intact  Orientation:  Person, Place, Time, and Situation  Reliability:  fair  Insight:  Fair  Judgement:  Poor  Impulse Control:  Poor      Imaging Results (Last 24 Hours)       ** No results found for the last 24 hours. **             ECG/EMG Results (most recent)       Procedure Component Value Units Date/Time    ECG 12 Lead Other; Baseline Cardiac Status [[id number]] Collected: 08/16/23 1752     Updated: 08/16/23 1753     QT Interval 422 ms      QTC Interval 516 ms     Narrative:      Test Reason : Other~  Blood Pressure :   */*   mmHG  Vent. Rate :  90 BPM     Atrial Rate :  90 BPM     P-R Int : 176 ms          QRS Dur :  84 ms      QT Int : 422 ms       P-R-T Axes :  71  65  95 degrees     QTc Int : 516 ms    Normal sinus rhythm  Voltage criteria for left ventricular hypertrophy  Nonspecific T wave abnormality  Prolonged QT  Abnormal ECG  No previous ECGs available    Referred By: NUBIA           Confirmed By:              Lab Results   Component Value Date    GLUCOSE 102 (H) 08/17/2023    BUN 32 (H) 08/17/2023    CREATININE 1.33 (H) 08/17/2023    BCR 24.1 08/17/2023    CO2 20.9 (L) 08/17/2023    CALCIUM 8.8 08/17/2023    ALBUMIN 3.6 08/17/2023    AST 27 08/17/2023    ALT 20 08/17/2023       Lab  Results   Component Value Date    WBC 11.93 (H) 08/16/2023    HGB 11.0 (L) 08/16/2023    HCT 34.0 08/16/2023    MCV 84.2 08/16/2023     08/16/2023       Pain Management Panel          Latest Ref Rng & Units 8/16/2023   Pain Management Panel   Amphetamine, Urine Qual Negative Negative    Barbiturates Screen, Urine Negative Negative    Benzodiazepine Screen, Urine Negative Negative    Buprenorphine, Screen, Urine Negative Negative    Cocaine Screen, Urine Negative Negative    Fentanyl, Urine Negative Positive    Methadone Screen , Urine Negative Negative    Methamphetamine, Ur Negative Positive        Brief Urine Lab Results  (Last result in the past 365 days)        Color   Clarity   Blood   Leuk Est   Nitrite   Protein   CREAT   Urine HCG        08/16/23 1103 Yellow   Cloudy   Negative   Trace   Negative   Trace                   DATA  Labs reviewed. Sodium 135, BUN 25, Creatinine 1.33, eGFR 48.8, glucose 102. WBC 11.93, Hemoglobin 11. UA shows cloudy appearance, glucose, trace leukocyte esterase, trace protein, 3-5 WBC. UDS positive for fentanyl, methamphetamine.  EKG reviewed. QTc interval 516.   LORETO reviewed.   Record reviewed. No previous treatment noted in this hospital for mental health or substance use problems.            ASSESSMENT & PLAN:        Opioid use disorder, severe, dependence  -Clonidine detox  -Comfort meds      Methamphetamine use disorder, severe, dependence\  -Supportive treatment      HTN (hypertension)  -Start amlodipine 5 mg daily      Nicotine use disorder  -Encouraged cessation. Discussed risks of nicotine use. Patient states she is not ready to stop at this time.       Elevated creatinine  -Trending better, appear prerenal, encouraged fluid intake. Check BMP in am      QTc prolongation  -Stop hydroxyzine, ondansetron and trazodone      Hyponatremia  -Encouraged better po intake  -Repeat BMP in am     Shortness of breath  -Patient reports a history of COPD  -Duo-Nebs as needed.      The patient has been admitted for safety and stabilization.  Patient will be monitored for suicidality daily and maintained on Special Precautions Level 4 (q30 min checks).  The patient will have individual and group therapy with a master's level therapist. A master treatment plan will be developed and agreed upon by the patient and his/her treatment team.  The patient's estimated length of stay in the hospital is 5-7 days.       Written by Betsey Logan acting as scribe for Dr.Mazhar Stewart signature on this note affirms that the note adequately documents the care provided.   This note was generated using a scribe,   Betsey Logan MA  08/17/23  9:23 AM EDT    IQuiana MD, personally performed the services described in this documentation as scribed by the above named individual in my presence, and it is both accurate and complete.

## 2023-08-17 NOTE — NURSING NOTE
Dr. Stewart made aware of pts /124, no new orders given, suggested that pt be given PRN Hydralazine 25mg. Pt reports no issues at this time.

## 2023-08-17 NOTE — PLAN OF CARE
Goal Outcome Evaluation:  Plan of Care Reviewed With: patient  Patient Agreement with Plan of Care: agrees     Progress: improving  Outcome Evaluation: Pt is calm and cooperative with staff. Pt reports fair sleep and a fair appetite. Pt rates cravings at 4. Pt rates Anx 10, Dep 10 and denies SI/HI/AVH at this time.

## 2023-08-17 NOTE — PLAN OF CARE
Goal Outcome Evaluation:  Plan of Care Reviewed With: patient  Patient Agreement with Plan of Care: agrees     Progress: improving     Pt had difficulty falling and staying asleep, refused group, and appetite is poor. Pt given Clonidine PAS dose and Hydralazine prn medication. New order obtained for Requip 0.5mg PO for restless legs.

## 2023-08-18 LAB
ANION GAP SERPL CALCULATED.3IONS-SCNC: 9.8 MMOL/L (ref 5–15)
BUN SERPL-MCNC: 28 MG/DL (ref 6–20)
BUN/CREAT SERPL: 22.4 (ref 7–25)
CALCIUM SPEC-SCNC: 9.3 MG/DL (ref 8.6–10.5)
CHLORIDE SERPL-SCNC: 104 MMOL/L (ref 98–107)
CO2 SERPL-SCNC: 23.2 MMOL/L (ref 22–29)
CREAT SERPL-MCNC: 1.25 MG/DL (ref 0.57–1)
EGFRCR SERPLBLD CKD-EPI 2021: 52.6 ML/MIN/1.73
GLUCOSE SERPL-MCNC: 71 MG/DL (ref 65–99)
HAV IGM SERPL QL IA: ABNORMAL
HBV CORE IGM SERPL QL IA: ABNORMAL
HBV SURFACE AG SERPL QL IA: ABNORMAL
HCV AB SER DONR QL: REACTIVE
POTASSIUM SERPL-SCNC: 4.6 MMOL/L (ref 3.5–5.2)
SODIUM SERPL-SCNC: 137 MMOL/L (ref 136–145)

## 2023-08-18 PROCEDURE — 80048 BASIC METABOLIC PNL TOTAL CA: CPT | Performed by: PSYCHIATRY & NEUROLOGY

## 2023-08-18 PROCEDURE — 99232 SBSQ HOSP IP/OBS MODERATE 35: CPT | Performed by: PSYCHIATRY & NEUROLOGY

## 2023-08-18 RX ORDER — AMLODIPINE BESYLATE 10 MG/1
10 TABLET ORAL
Status: DISCONTINUED | OUTPATIENT
Start: 2023-08-19 | End: 2023-08-21 | Stop reason: HOSPADM

## 2023-08-18 RX ORDER — BUPRENORPHINE 2 MG/1
2 TABLET SUBLINGUAL DAILY
Status: COMPLETED | OUTPATIENT
Start: 2023-08-20 | End: 2023-08-20

## 2023-08-18 RX ORDER — BUPRENORPHINE 2 MG/1
2 TABLET SUBLINGUAL 2 TIMES DAILY
Status: COMPLETED | OUTPATIENT
Start: 2023-08-18 | End: 2023-08-18

## 2023-08-18 RX ORDER — BUPRENORPHINE 2 MG/1
2 TABLET SUBLINGUAL 2 TIMES DAILY
Status: COMPLETED | OUTPATIENT
Start: 2023-08-19 | End: 2023-08-19

## 2023-08-18 RX ADMIN — BUPRENORPHINE HCL 2 MG: 2 TABLET SUBLINGUAL at 21:24

## 2023-08-18 RX ADMIN — CYCLOBENZAPRINE 10 MG: 10 TABLET, FILM COATED ORAL at 08:01

## 2023-08-18 RX ADMIN — AMLODIPINE BESYLATE 5 MG: 5 TABLET ORAL at 08:01

## 2023-08-18 RX ADMIN — CLONIDINE HYDROCHLORIDE 0.1 MG: 0.1 TABLET ORAL at 02:54

## 2023-08-18 RX ADMIN — BUPRENORPHINE HCL 2 MG: 2 TABLET SUBLINGUAL at 14:26

## 2023-08-18 RX ADMIN — METOPROLOL TARTRATE 50 MG: 25 TABLET, FILM COATED ORAL at 02:55

## 2023-08-18 NOTE — PLAN OF CARE
Goal Outcome Evaluation:  Plan of Care Reviewed With: patient  Patient Agreement with Plan of Care: agrees     Progress: improving  Outcome Evaluation: Patient irritable during morning assessment. Rates anxiety, depression, and cravings 10/10. Withdrawal symptoms: leg cramps and HTN. Denies SI, HI, or Mccracken. No other issue snoted at this time. Will continue to monitor.

## 2023-08-18 NOTE — PLAN OF CARE
Problem: Adult Behavioral Health Plan of Care  Goal: Plan of Care Review  Outcome: Ongoing, Progressing  Flowsheets (Taken 8/18/2023 1033 by Brenden Covington, RN)  Progress: improving  Plan of Care Reviewed With: patient  Patient Agreement with Plan of Care: agrees  Outcome Evaluation: Patient irritable during morning assessment. Rates anxiety, depression, and cravings 10/10. Withdrawal symptoms: leg cramps and HTN. Denies SI, HI, or Mccracken. No other issue snoted at this time. Will continue to monitor.  Goal: Patient-Specific Goal (Individualization)  Outcome: Ongoing, Progressing  Flowsheets  Taken 8/17/2023 1040 by Nelly Araya CSW  Patient-Specific Goals (Include Timeframe): Identify 2-3 coping skills, address relapse prevention methods, complete aftercare plans, and deny SI/Hi prior to discharge.  Individualized Care Needs: Therapist to offer 1-4 therapy sessions, aftercare planning, safety planning, family education, group therapy, and brief CBT/MI interventions.  Anxieties, Fears or Concerns: none verbalized  Taken 8/17/2023 1028 by Nelly Araya CSW  Patient Personal Strengths:   resilient   resourceful   motivated for treatment   motivated for recovery  Patient Vulnerabilities:   substance abuse/addiction   poor impulse control   poor physical health   occupational insecurity   housing insecurity   limited support system  Goal: Adheres to Safety Considerations for Self and Others  Outcome: Ongoing, Progressing  Flowsheets (Taken 8/18/2023 1317)  Adheres to Safety Considerations for Self and Others: making progress toward outcome  Intervention: Develop and Maintain Individualized Safety Plan  Flowsheets (Taken 8/18/2023 1000 by Brenden Covington, RN)  Safety Measures:   safety rounds completed   environmental rounds completed  Goal: Absence of New-Onset Illness or Injury  Outcome: Ongoing, Progressing  Goal: Optimized Coping Skills in Response to Life Stressors  Outcome: Ongoing,  Progressing  Flowsheets (Taken 8/17/2023 1040 by Nelly Araya CSW)  Optimized Coping Skills in Response to Life Stressors: making progress toward outcome  Intervention: Promote Effective Coping Strategies  Flowsheets (Taken 8/18/2023 0845 by Brenden Covington, RN)  Supportive Measures:   active listening utilized   self-care encouraged   verbalization of feelings encouraged  Goal: Develops/Participates in Therapeutic [place] to Support Successful Transition  Outcome: Ongoing, Progressing  Flowsheets (Taken 8/17/2023 1040 by Nelly Araya CSW)  Develops/Participates in Therapeutic [place] to Support Successful Transition: making progress toward outcome  Intervention: Foster Therapeutic [place]  Flowsheets (Taken 8/18/2023 0845 by Brenden Covington, RN)  Trust Relationship/Rapport:   care explained   choices provided   emotional support provided   empathic listening provided   questions answered   questions encouraged   reassurance provided   thoughts/feelings acknowledged  Intervention: Mutually Develop Transition Plan  Flowsheets  Taken 8/18/2023 1316 by Khushbu Oswald  Concerns to be Addressed:   substance/tobacco abuse/use   cognitive/perceptual   coping/stress   mental health   discharge planning  Readmission Within the Last 30 Days: no previous admission in last 30 days  Taken 8/17/2023 1040 by Nelly Araya CSW  Outpatient/Agency/Support Group Needs: residential services  Transition Support:   follow-up care discussed   follow-up care coordinated   community resources reviewed   crisis management plan promoted   crisis management plan verbalized  Anticipated Discharge Disposition: residential substance use unit  Taken 8/17/2023 1038 by Nelly Araya CSW  Discharge Coordination/Progress: Patient does not have insurance coverage. Therapist met with patient to complete assessment, patient agreeable.  Transportation Anticipated: agency  Transportation Concerns: none  Current Discharge  Risk: substance use/abuse  Patient/Family Anticipated Services at Transition:   mental health services   rehabilitation services  Patient's Choice of Community Agency(s): [facility]  Patient/Family Anticipates Transition to: inpatient rehabilitation facility  Offered/Gave Vendor List: no    1030     DATA:  Therapist met with Patient individually this date. Patient agreeable to discuss current treatment progress and discharge concerns.     CLINICAL MANUVERING/INTERVENTIONS:  Assisted Patient in processing session content; acknowledged and normalized Patient’s thoughts, feelings, and concerns by utilizing a person-centered approach in efforts to build appropriate rapport and a positive therapeutic relationship with open and honest communication. Allowed Patient to ventilate regarding current stressors and triggers for negative emotions and thoughts in a safe nonjudgmental environment with unconditional positive regard, active listening skills, and empathy. Therapist implemented motivational interviewing techniques to assist Patient with exploring personal growth and change and discussed distress tolerance skills, self soothing techniques, and applied cognitive behavioral strategies to facilitate identification of maladaptive patterns of thinking and behavior.Therapist utilized dialectical behavior techniques to teach and model emotional regulation and relaxation methods. Therapist assisted Patient with identifying and implementing healthier coping strategies. Therapist assisted Patient with safety planning; Patient agreed to continue honest communication with Treatment Team while inpatient and identify any SI/HI.  Patient encouraged to seek nearest ER or contact 911 if danger to self or others post discharge.     ASSESSMENT: Therapist met 1:1 with Patient for session. Therapist introduced myself since Primary Therapist is out for today, Patient agreeable. Patient reports feeling depressed and not  sleeping. Patient rates her withdraw systems 10/10. Patient says she is still going to [facility] after she gets discharged and does not want any family involvement at this time. Therapist recommended Patient talking to Provider today about her depression getting worse. Patient denies any SI/HI/AVH at this current time.       PLAN:   Patient will continue stabilization. Patient will continue to receive services offered by Treatment Team.     Patient will follow-up with [facility].     Assistance with Transportation will be needed.    Goal Outcome Evaluation:

## 2023-08-18 NOTE — PROGRESS NOTES
"INPATIENT PSYCHIATRIC PROGRESS NOTE    Name:  Dakota Holloway  :  1975  MRN:  [id number]  Visit Number:  [id number]  Length of stay:  2    SUBJECTIVE    CC/Focus of Exam: opioid and meth use    INTERVAL HISTORY:  The patient reports she is feeling tired and has no energy.  Depression rating 9/10  Anxiety rating 9/10  Sleep: not good  Withdrawal sx: leg cramps  Craving: 10/10    Review of Systems   Constitutional:  Positive for chills, diaphoresis and fatigue.   Musculoskeletal:  Positive for myalgias.   Psychiatric/Behavioral:  Positive for dysphoric mood. The patient is nervous/anxious.      OBJECTIVE    Temp:  [97.2 °F (36.2 °C)-97.7 °F (36.5 °C)] 97.2 °F (36.2 °C)  Heart Rate:  [] 68  Resp:  [16-18] 18  BP: (150-180)/(101-122) 168/122    MENTAL STATUS EXAM:  Appearance:Casually dressed, good hygeine.   Cooperation:Cooperative  Psychomotor: No psychomotor agitation/retardation, No EPS, No motor tics  Speech-normal rate, amount.  Mood \"anxious\"   Affect-congruent, appropriate, stable  Thought Content-goal directed, no delusional material present  Thought process-linear, organized.  Suicidality: No SI  Homicidality: No HI  Perception: No AH/VH  Insight-fair   Judgement-fair    Lab Results (last 24 hours)       Procedure Component Value Units Date/Time    Basic Metabolic Panel [[id number]]  (Abnormal) Collected: 23 1049    Specimen: Blood Updated: 23 1142     Glucose 71 mg/dL      BUN 28 mg/dL      Creatinine 1.25 mg/dL      Sodium 137 mmol/L      Potassium 4.6 mmol/L      Chloride 104 mmol/L      CO2 23.2 mmol/L      Calcium 9.3 mg/dL      BUN/Creatinine Ratio 22.4     Anion Gap 9.8 mmol/L      eGFR 52.6 mL/min/1.73     Narrative:      GFR Normal >60  Chronic Kidney Disease <60  Kidney Failure <15      Hepatitis Panel, Acute [[id number]]  (Abnormal) Collected: 23 1050    Specimen: Blood from Arm, Left Updated: 23 1009     Hepatitis B Surface Ag Non-Reactive     Hep A IgM " Non-Reactive     Hep B C IgM Non-Reactive     Hepatitis C Ab Reactive    Narrative:      Results may be falsely decreased if patient taking Biotin.                Imaging Results (Last 24 Hours)       ** No results found for the last 24 hours. **               ECG/EMG Results (most recent)       Procedure Component Value Units Date/Time    ECG 12 Lead Other; Baseline Cardiac Status [[id number]] Collected: 23 1752     Updated: 23 1653     QT Interval 422 ms      QTC Interval 516 ms     Narrative:      Test Reason : Other~  Blood Pressure :   */*   mmHG  Vent. Rate :  90 BPM     Atrial Rate :  90 BPM     P-R Int : 176 ms          QRS Dur :  84 ms      QT Int : 422 ms       P-R-T Axes :  71  65  95 degrees     QTc Int : 516 ms    Normal sinus rhythm  Voltage criteria for left ventricular hypertrophy  Nonspecific T wave abnormality  Prolonged QT  Abnormal ECG  No previous ECGs available  Confirmed by Dayami Laboy () on 2023 4:46:17 PM    Referred By: NUBIA           Confirmed By: Dayami Laboy             ALLERGIES: Patient has no known allergies.    Medication Review:   Scheduled Medications:  amLODIPine, 5 mg, Oral, Q24H         PRN Medications:    acetaminophen    aluminum-magnesium hydroxide-simethicone    benzonatate    benztropine **OR** benztropine    [] cloNIDine **FOLLOWED BY** cloNIDine **FOLLOWED BY** [START ON 2023] cloNIDine **FOLLOWED BY** [START ON 2023] cloNIDine    cyclobenzaprine    dicyclomine    famotidine    hydrALAZINE    ipratropium-albuterol    loperamide    magnesium hydroxide    sodium chloride   All medications reviewed.    ASSESSMENT & PLAN:      Opioid use disorder, severe, dependence  -Clonidine detox  -Comfort meds  -Short Subutex detox         Methamphetamine use disorder, severe, dependence\  -Supportive treatment       HTN (hypertension)  -Increase amlodipine 10 mg daily       Nicotine use disorder  -Encouraged cessation. Discussed risks of nicotine  use. Patient states she is not ready to stop at this time.        Elevated creatinine  -Improving, continue to monitor       QTc prolongation  -Stop hydroxyzine, ondansetron and trazodone       Hyponatremia  -Resolved      Shortness of breath  -Patient reports a history of COPD  -Duo-Nebs as needed.     Special precautions: Special Precautions Level 4 (q30 min checks).    Behavioral Health Treatment Plan and Problem List: I have reviewed and approved the Behavioral Health Treatment Plan and Problem list.  The patient has had a chance to review and agrees with the treatment plan.    Copied text in portions of this note has been reviewed and is accurate as of 08/18/23         Clinician:  Quiana Stewart MD  08/18/23  13:12 EDT

## 2023-08-18 NOTE — NURSING NOTE
Dr. Nicolás Bustillo contacted related to blood pressure 179/119  Heart rate 105 by monitor and BP of 180/122 manual right radial.    New order given:  Metoprolol 50mg PO once stat

## 2023-08-18 NOTE — NURSING NOTE
Dr. Bustillo contacted related to patient blood pressure reading of 178/121.    New order given:  Metoprolol 50mg PO once stat

## 2023-08-18 NOTE — PLAN OF CARE
Goal Outcome Evaluation:  Plan of Care Reviewed With: patient  Patient Agreement with Plan of Care: agrees     Progress: improving     Pt given Metoprolol throughout night due to patient having continued high blood pressure. Pt participated in group, appetite is good, and had difficulty falling sleep.

## 2023-08-19 LAB
ANION GAP SERPL CALCULATED.3IONS-SCNC: 11.4 MMOL/L (ref 5–15)
BUN SERPL-MCNC: 37 MG/DL (ref 6–20)
BUN/CREAT SERPL: 30.1 (ref 7–25)
CALCIUM SPEC-SCNC: 8.8 MG/DL (ref 8.6–10.5)
CHLORIDE SERPL-SCNC: 102 MMOL/L (ref 98–107)
CO2 SERPL-SCNC: 22.6 MMOL/L (ref 22–29)
CREAT SERPL-MCNC: 1.23 MG/DL (ref 0.57–1)
EGFRCR SERPLBLD CKD-EPI 2021: 53.6 ML/MIN/1.73
GLUCOSE SERPL-MCNC: 86 MG/DL (ref 65–99)
POTASSIUM SERPL-SCNC: 4.8 MMOL/L (ref 3.5–5.2)
SODIUM SERPL-SCNC: 136 MMOL/L (ref 136–145)

## 2023-08-19 PROCEDURE — 99232 SBSQ HOSP IP/OBS MODERATE 35: CPT | Performed by: PSYCHIATRY & NEUROLOGY

## 2023-08-19 PROCEDURE — 80048 BASIC METABOLIC PNL TOTAL CA: CPT | Performed by: PSYCHIATRY & NEUROLOGY

## 2023-08-19 PROCEDURE — 94799 UNLISTED PULMONARY SVC/PX: CPT

## 2023-08-19 RX ADMIN — CLONIDINE HYDROCHLORIDE 0.1 MG: 0.1 TABLET ORAL at 02:26

## 2023-08-19 RX ADMIN — BUPRENORPHINE HCL 2 MG: 2 TABLET SUBLINGUAL at 08:03

## 2023-08-19 RX ADMIN — BUPRENORPHINE HCL 2 MG: 2 TABLET SUBLINGUAL at 21:16

## 2023-08-19 RX ADMIN — CLONIDINE HYDROCHLORIDE 0.1 MG: 0.1 TABLET ORAL at 14:30

## 2023-08-19 RX ADMIN — METOPROLOL TARTRATE 25 MG: 25 TABLET, FILM COATED ORAL at 21:16

## 2023-08-19 RX ADMIN — AMLODIPINE BESYLATE 10 MG: 10 TABLET ORAL at 08:03

## 2023-08-19 NOTE — NURSING NOTE
Patients B/P was 159/122, RN notified. This nurse contacted Dr. Wang via telephone and notified her of B/P. Dr. Wang instructed to give patient a dose of her PRN Clonidine that is on MAR and follow up in an hour on repeat B/P. No distress noted, pt denies any S/S's at this time.

## 2023-08-19 NOTE — PLAN OF CARE
Goal Outcome Evaluation:  Plan of Care Reviewed With: patient  Patient Agreement with Plan of Care: agrees     Progress: improving  Outcome Evaluation: Patient quiet and withdrawn. Rates anxiety, depression, and cravings. Denies withdrawal symptoms, SI, Hi, or Mccracken. No other issues noted a this time. Will continue to monitor.

## 2023-08-19 NOTE — PLAN OF CARE
Goal Outcome Evaluation:  Plan of Care Reviewed With: patient  Patient Agreement with Plan of Care: agrees     Progress: improving  Outcome Evaluation: Pt continues to withdraw to her room and spends most of her time lying in bed, often sleeping. Pt rates anxiety/depression/cravin 10/10 and denies SI/HIAVH.    Pt appeared to sleep throughout the night, but did require PRN clonidine to help to lower BP after 0200 v/s.

## 2023-08-19 NOTE — PROGRESS NOTES
"INPATIENT PSYCHIATRIC PROGRESS NOTE    Name:  Dakota Holloway  :  1975  MRN:  [id number]  Visit Number:  [id number]  Length of stay:  3    SUBJECTIVE    CC/Focus of Exam: opioid and meth use    INTERVAL HISTORY:  Patient reports today that she is feeling better and denies any major medication side effects but her blood pressure has been elevated.  She is currently on amlodipine.  She denies any other major complaints.    Depression rating 0/10  Anxiety rating 0/10  Sleep: improved  Withdrawal sx: denies  Cravin/10    Review of Systems   Constitutional:  Negative for chills, diaphoresis and fatigue.   Musculoskeletal:  Negative for myalgias.   Psychiatric/Behavioral:  Negative for dysphoric mood. The patient is not nervous/anxious.      OBJECTIVE    Temp:  [96.9 °F (36.1 °C)-97.7 °F (36.5 °C)] 97 °F (36.1 °C)  Heart Rate:  [] 100  Resp:  [16] 16  BP: (133-176)/() 149/107    MENTAL STATUS EXAM:  Appearance:Casually dressed, good hygeine.   Cooperation:Cooperative  Psychomotor: No psychomotor agitation/retardation, No EPS, No motor tics  Speech-normal rate, amount.  Mood \"okay\"   Affect-congruent, appropriate, stable  Thought Content-goal directed, no delusional material present  Thought process-linear, organized.  Suicidality: No SI  Homicidality: No HI  Perception: No AH/VH  Insight-fair   Judgement-fair    Lab Results (last 24 hours)       Procedure Component Value Units Date/Time    Basic Metabolic Panel [[id number]]  (Abnormal) Collected: 23 1548    Specimen: Blood Updated: 23 1714     Glucose 86 mg/dL      BUN 37 mg/dL      Creatinine 1.23 mg/dL      Sodium 136 mmol/L      Potassium 4.8 mmol/L      Chloride 102 mmol/L      CO2 22.6 mmol/L      Calcium 8.8 mg/dL      BUN/Creatinine Ratio 30.1     Anion Gap 11.4 mmol/L      eGFR 53.6 mL/min/1.73     Narrative:      GFR Normal >60  Chronic Kidney Disease <60  Kidney Failure <15                 Imaging Results (Last 24 Hours)       " ** No results found for the last 24 hours. **               ECG/EMG Results (most recent)       Procedure Component Value Units Date/Time    ECG 12 Lead Other; Baseline Cardiac Status [[id number]] Collected: 23     Updated: 23     QT Interval 422 ms      QTC Interval 516 ms     Narrative:      Test Reason : Other~  Blood Pressure :   */*   mmHG  Vent. Rate :  90 BPM     Atrial Rate :  90 BPM     P-R Int : 176 ms          QRS Dur :  84 ms      QT Int : 422 ms       P-R-T Axes :  71  65  95 degrees     QTc Int : 516 ms    Normal sinus rhythm  Voltage criteria for left ventricular hypertrophy  Nonspecific T wave abnormality  Prolonged QT  Abnormal ECG  No previous ECGs available  Confirmed by Dayami Laboy () on 2023 4:46:17 PM    Referred By: NUBIA           Confirmed By: Dayami Laboy             ALLERGIES: Patient has no known allergies.    Medication Review:   Scheduled Medications:  amLODIPine, 10 mg, Oral, Q24H  buprenorphine, 2 mg, Sublingual, BID   Followed by  [START ON 2023] buprenorphine, 2 mg, Sublingual, Daily         PRN Medications:    acetaminophen    aluminum-magnesium hydroxide-simethicone    benzonatate    benztropine **OR** benztropine    [] cloNIDine **FOLLOWED BY** [] cloNIDine **FOLLOWED BY** cloNIDine **FOLLOWED BY** [START ON 2023] cloNIDine    cyclobenzaprine    dicyclomine    famotidine    hydrALAZINE    ipratropium-albuterol    loperamide    magnesium hydroxide    sodium chloride   All medications reviewed.    ASSESSMENT & PLAN:      Opioid use disorder, severe, dependence  -Clonidine detox  -Comfort meds  -Short Subutex detox         Methamphetamine use disorder, severe, dependence\  -Supportive treatment       HTN (hypertension)  -Increased amlodipine 10 mg daily 2023  -Start metoprolol 25 mg p.o. twice daily       Nicotine use disorder  -Encouraged cessation. Discussed risks of nicotine use. Patient states she is not ready to stop  at this time.        Elevated creatinine  -Improving, continue to monitor       QTc prolongation  -Stop hydroxyzine, ondansetron and trazodone       Hyponatremia  -Resolved      Shortness of breath  -Patient reports a history of COPD  -Duo-Nebs as needed.     Special precautions: Special Precautions Level 4 (q30 min checks).    Behavioral Health Treatment Plan and Problem List: I have reviewed and approved the Behavioral Health Treatment Plan and Problem list.  The patient has had a chance to review and agrees with the treatment plan.    Copied text in portions of this note has been reviewed and is accurate as of 08/19/23         Clinician:  Lui Covington MD  08/19/23  17:38 EDT

## 2023-08-20 PROCEDURE — 99232 SBSQ HOSP IP/OBS MODERATE 35: CPT | Performed by: PSYCHIATRY & NEUROLOGY

## 2023-08-20 PROCEDURE — 94799 UNLISTED PULMONARY SVC/PX: CPT

## 2023-08-20 RX ADMIN — METOPROLOL TARTRATE 25 MG: 25 TABLET, FILM COATED ORAL at 20:59

## 2023-08-20 RX ADMIN — METOPROLOL TARTRATE 25 MG: 25 TABLET, FILM COATED ORAL at 08:01

## 2023-08-20 RX ADMIN — AMLODIPINE BESYLATE 10 MG: 10 TABLET ORAL at 08:01

## 2023-08-20 RX ADMIN — CLONIDINE HYDROCHLORIDE 0.1 MG: 0.1 TABLET ORAL at 07:57

## 2023-08-20 RX ADMIN — BUPRENORPHINE HCL 2 MG: 2 TABLET SUBLINGUAL at 08:01

## 2023-08-20 RX ADMIN — CYCLOBENZAPRINE 10 MG: 10 TABLET, FILM COATED ORAL at 20:59

## 2023-08-20 NOTE — NURSING NOTE
Notified Dr. Covington via telephone conversation of blood pressure of 169/129 and pulse 92. In addition, informed doctor of what medications were given for blood pressure. See MAR.

## 2023-08-20 NOTE — PLAN OF CARE
Goal Outcome Evaluation:  Plan of Care Reviewed With: patient  Patient Agreement with Plan of Care: agrees     Progress: improving  Outcome Evaluation: Patient calm and cooperative. Rates anxiety and depression 5/10. Withdrawal symptoms: anxiety, depression, and HTN. Denies cravings, SI, HI, or [place]. No other issues noted at this time. Will continue to monitor.          PAST MEDICAL HISTORY:  H/O diarrhea

## 2023-08-20 NOTE — PROGRESS NOTES
"INPATIENT PSYCHIATRIC PROGRESS NOTE    Name:  Dakota Holloway  :  1975  MRN:  [id number]  Visit Number:  [id number]  Length of stay:  4    SUBJECTIVE    CC/Focus of Exam: opioid and meth use    INTERVAL HISTORY:  Patient reports no major complaints today.  She feels much better.  She is having a muscle spasm in her right bicep which she reports has happened before intermittently without any worsening or improving factors.  Is not overly bothersome today so we will continue to monitor.  Otherwise, doing very well.    Depression rating 0/10  Anxiety rating 0/10  Sleep: improved  Withdrawal sx: denies  Cravin/10    Review of Systems   Constitutional:  Negative for chills, diaphoresis and fatigue.   Musculoskeletal:  Negative for myalgias.   Psychiatric/Behavioral:  Negative for dysphoric mood. The patient is not nervous/anxious.      OBJECTIVE    Temp:  [96.9 °F (36.1 °C)-98.6 °F (37 °C)] 97.8 °F (36.6 °C)  Heart Rate:  [] 95  Resp:  [16-18] 18  BP: (120-169)/() 120/87    MENTAL STATUS EXAM:  Appearance:Casually dressed, good hygeine.   Cooperation:Cooperative  Psychomotor: No psychomotor agitation/retardation, No EPS, No motor tics  Speech-normal rate, amount.  Mood \"feeling better\"   Affect-congruent, appropriate, stable  Thought Content-goal directed, no delusional material present  Thought process-linear, organized.  Suicidality: No SI  Homicidality: No HI  Perception: No AH/VH  Insight-fair   Judgement-fair    Lab Results (last 24 hours)       Procedure Component Value Units Date/Time    Basic Metabolic Panel [[id number]]  (Abnormal) Collected: 23 1548    Specimen: Blood Updated: 23 1714     Glucose 86 mg/dL      BUN 37 mg/dL      Creatinine 1.23 mg/dL      Sodium 136 mmol/L      Potassium 4.8 mmol/L      Chloride 102 mmol/L      CO2 22.6 mmol/L      Calcium 8.8 mg/dL      BUN/Creatinine Ratio 30.1     Anion Gap 11.4 mmol/L      eGFR 53.6 mL/min/1.73     Narrative:      GFR " Normal >60  Chronic Kidney Disease <60  Kidney Failure <15                 Imaging Results (Last 24 Hours)       ** No results found for the last 24 hours. **               ECG/EMG Results (most recent)       Procedure Component Value Units Date/Time    ECG 12 Lead Other; Baseline Cardiac Status [[id number]] Collected: 23     Updated: 23 1653     QT Interval 422 ms      QTC Interval 516 ms     Narrative:      Test Reason : Other~  Blood Pressure :   */*   mmHG  Vent. Rate :  90 BPM     Atrial Rate :  90 BPM     P-R Int : 176 ms          QRS Dur :  84 ms      QT Int : 422 ms       P-R-T Axes :  71  65  95 degrees     QTc Int : 516 ms    Normal sinus rhythm  Voltage criteria for left ventricular hypertrophy  Nonspecific T wave abnormality  Prolonged QT  Abnormal ECG  No previous ECGs available  Confirmed by Dayami Laboy () on 2023 4:46:17 PM    Referred By: NUBIA           Confirmed By: Dayami Laboy             ALLERGIES: Patient has no known allergies.    Medication Review:   Scheduled Medications:  amLODIPine, 10 mg, Oral, Q24H  metoprolol tartrate, 25 mg, Oral, BID         PRN Medications:    acetaminophen    aluminum-magnesium hydroxide-simethicone    benzonatate    benztropine **OR** benztropine    [] cloNIDine **FOLLOWED BY** [] cloNIDine **FOLLOWED BY** [] cloNIDine **FOLLOWED BY** cloNIDine    cyclobenzaprine    dicyclomine    famotidine    hydrALAZINE    ipratropium-albuterol    loperamide    magnesium hydroxide    sodium chloride   All medications reviewed.    ASSESSMENT & PLAN:      Opioid use disorder, severe, dependence  -Clonidine detox to complete tonight  -Comfort meds  -Short Subutex detox completed         Methamphetamine use disorder, severe, dependence\  -Supportive treatment       HTN (hypertension)  -Increased amlodipine 10 mg daily 2023  -Started metoprolol 25 mg p.o. twice daily 23  -Improved today       Nicotine use  disorder  -Encouraged cessation. Discussed risks of nicotine use. Patient states she is not ready to stop at this time.        Elevated creatinine  -Improving, continue to monitor       QTc prolongation  -Stop hydroxyzine, ondansetron and trazodone       Hyponatremia  -Resolved      Shortness of breath  -Patient reports a history of COPD  -Duo-Nebs as needed.     Special precautions: Special Precautions Level 4 (q30 min checks).    Behavioral Health Treatment Plan and Problem List: I have reviewed and approved the Behavioral Health Treatment Plan and Problem list.  The patient has had a chance to review and agrees with the treatment plan.    Copied text in portions of this note has been reviewed and is accurate as of 08/20/23         Clinician:  Lui Covington MD  08/20/23  14:32 EDT

## 2023-08-21 VITALS
OXYGEN SATURATION: 97 % | HEART RATE: 83 BPM | BODY MASS INDEX: 21.24 KG/M2 | TEMPERATURE: 96.9 F | WEIGHT: 124.4 LBS | RESPIRATION RATE: 16 BRPM | SYSTOLIC BLOOD PRESSURE: 146 MMHG | HEIGHT: 64 IN | DIASTOLIC BLOOD PRESSURE: 110 MMHG

## 2023-08-21 PROCEDURE — 99238 HOSP IP/OBS DSCHRG MGMT 30/<: CPT | Performed by: PSYCHIATRY & NEUROLOGY

## 2023-08-21 RX ORDER — AMLODIPINE BESYLATE 10 MG/1
10 TABLET ORAL
Qty: 30 TABLET | Refills: 0 | Status: SHIPPED | OUTPATIENT
Start: 2023-08-22

## 2023-08-21 RX ADMIN — METOPROLOL TARTRATE 25 MG: 25 TABLET, FILM COATED ORAL at 08:19

## 2023-08-21 RX ADMIN — AMLODIPINE BESYLATE 10 MG: 10 TABLET ORAL at 08:19

## 2023-08-21 RX ADMIN — CYCLOBENZAPRINE 10 MG: 10 TABLET, FILM COATED ORAL at 08:19

## 2023-08-21 NOTE — PLAN OF CARE
Goal Outcome Evaluation:  Plan of Care Reviewed With: patient  Patient Agreement with Plan of Care: agrees     Progress: improving  Outcome Evaluation: Pt BP is much better today, and while tearful upon assessment pt is hopeful about discharge Monday. Pt rates anxiety/depression 7/10 and denies craving, SI/HI/AVH.  Pt appeared to sleep throughout the night.

## 2023-08-21 NOTE — PLAN OF CARE
Problem: Adult Behavioral Health Plan of Care  Goal: Plan of Care Review  Outcome: Adequate for Care Transition  Flowsheets (Taken 8/21/2023 3962)  Plan of Care Reviewed With: patient  Patient Agreement with Plan of Care: agrees  Goal: Patient-Specific Goal (Individualization)  Outcome: Adequate for Care Transition  Goal: Adheres to Safety Considerations for Self and Others  Outcome: Adequate for Care Transition  Goal: Absence of New-Onset Illness or Injury  Outcome: Adequate for Care Transition  Goal: Optimized Coping Skills in Response to Life Stressors  Outcome: Adequate for Care Transition  Goal: Develops/Participates in Therapeutic [place] to Support Successful Transition  Outcome: Adequate for Care Transition   Goal Outcome Evaluation:  Plan of Care Reviewed With: patient  Patient Agreement with Plan of Care: agrees

## 2023-08-21 NOTE — CASE MANAGEMENT/SOCIAL WORK
Case Management/Social Work    Patient Name:  Dakota Holloway  YOB: 1975  MRN: [id number]  Admit Date:  8/16/2023    Patient expected to discharge back to [facility] on this date, agency to provide transportation. Patient reports improvement in mood and withdrawal symptoms, denies current SI/HI/AVH. Therapist has discussed healthy coping skills and relapse prevention with patient. Therapist has assisted patient in identifying risk factors which would indicate the need for higher level of care including thoughts to harm self or others and/or self-harming behavior. Encouraged patient to notify facility staff, call [phone] or present to the nearest emergency room should any of these events occur.     Electronically signed by:  MADISON Buck  08/21/23 11:24 EDT

## 2023-08-21 NOTE — DISCHARGE SUMMARY
":  1975  MRN:  [id number]  Visit Number:  [id number]      Date of Admission:2023   Date of Discharge:  2023    Discharge Diagnosis:  Principal Problem:    Opioid use disorder, severe, dependence  Active Problems:    Methamphetamine use disorder, severe, dependence    HTN (hypertension)    Nicotine use disorder        Admission Diagnosis:  Opioid use disorder, severe, dependence [F11.20]     HPI  Dakota Holloway is a 48 y.o. female who was admitted on 2023 with complaints of drug use and withdrawals.   For details please see H&P dated 23.     Hospital Course  Patient is a 48 y.o. female presented with opioid use disorder and withdrawals. The patient was admitted to the [facility] detox recovery unit for safety, further evaluation and treatment.  The patient was started on clonidine detox and Subutex detox was added  The patient was also able to take part in individual and group counseling sessions and work on appropriate coping skills.  The patient's blood pressure was elevated and she was started on amlodipine and dose increased to 10 mg and then metoprolol was added.   The patient's creatinine level was elevated at the time of admission and it was followed and it was trending down but was still elevated. She was informed about it and encouraged to follow-up with her primary care provider.  The patient made steady improvement in her withdrawal symptoms and mood and expressed feeling more positive and hopeful about future. Sleep and appetite were improved.  The day of discharge the patient was calm, cooperative and pleasant. Mood was reported to be good, and denied SI/HI/AVH. Also reported no medication side effects.  .      Mental Status Exam upon discharge:   Mood \"good\"   Affect-congruent, appropriate, stable  Thought Content-goal directed, no delusional material present  Thought process-linear, organized.  Suicidality: No SI  Homicidality: No HI  Perception: No " /    Procedures Performed         Consults:   Consults       No orders found from 7/18/2023 to 8/17/2023.            Pertinent Test Results:   Admission on 08/16/2023   Component Date Value Ref Range Status    QT Interval 08/16/2023 422  ms Final    QTC Interval 08/16/2023 516  ms Final    Glucose 08/17/2023 102 (H)  65 - 99 mg/dL Final    BUN 08/17/2023 32 (H)  6 - 20 mg/dL Final    Creatinine 08/17/2023 1.33 (H)  0.57 - 1.00 mg/dL Final    Sodium 08/17/2023 135 (L)  136 - 145 mmol/L Final    Potassium 08/17/2023 4.6  3.5 - 5.2 mmol/L Final    Slight hemolysis detected by analyzer. Results may be affected.    Chloride 08/17/2023 106  98 - 107 mmol/L Final    CO2 08/17/2023 20.9 (L)  22.0 - 29.0 mmol/L Final    Calcium 08/17/2023 8.8  8.6 - 10.5 mg/dL Final    Total Protein 08/17/2023 7.5  6.0 - 8.5 g/dL Final    Albumin 08/17/2023 3.6  3.5 - 5.2 g/dL Final    ALT (SGPT) 08/17/2023 20  1 - 33 U/L Final    AST (SGOT) 08/17/2023 27  1 - 32 U/L Final    Alkaline Phosphatase 08/17/2023 96  39 - 117 U/L Final    Total Bilirubin 08/17/2023 0.3  0.0 - 1.2 mg/dL Final    Globulin 08/17/2023 3.9  gm/dL Final    A/G Ratio 08/17/2023 0.9  g/dL Final    BUN/Creatinine Ratio 08/17/2023 24.1  7.0 - 25.0 Final    Anion Gap 08/17/2023 8.1  5.0 - 15.0 mmol/L Final    eGFR 08/17/2023 48.8 (L)  >60.0 mL/min/1.73 Final    Glucose 08/18/2023 71  65 - 99 mg/dL Final    BUN 08/18/2023 28 (H)  6 - 20 mg/dL Final    Creatinine 08/18/2023 1.25 (H)  0.57 - 1.00 mg/dL Final    Sodium 08/18/2023 137  136 - 145 mmol/L Final    Potassium 08/18/2023 4.6  3.5 - 5.2 mmol/L Final    Chloride 08/18/2023 104  98 - 107 mmol/L Final    CO2 08/18/2023 23.2  22.0 - 29.0 mmol/L Final    Calcium 08/18/2023 9.3  8.6 - 10.5 mg/dL Final    BUN/Creatinine Ratio 08/18/2023 22.4  7.0 - 25.0 Final    Anion Gap 08/18/2023 9.8  5.0 - 15.0 mmol/L Final    eGFR 08/18/2023 52.6 (L)  >60.0 mL/min/1.73 Final    Hepatitis B Surface Ag 08/16/2023 Non-Reactive   Non-Reactive Final    Hep A IgM 08/16/2023 Non-Reactive  Non-Reactive Final    Hep B C IgM 08/16/2023 Non-Reactive  Non-Reactive Final    Hepatitis C Ab 08/16/2023 Reactive (A)  Non-Reactive Final    Glucose 08/19/2023 86  65 - 99 mg/dL Final    BUN 08/19/2023 37 (H)  6 - 20 mg/dL Final    Creatinine 08/19/2023 1.23 (H)  0.57 - 1.00 mg/dL Final    Sodium 08/19/2023 136  136 - 145 mmol/L Final    Potassium 08/19/2023 4.8  3.5 - 5.2 mmol/L Final    Chloride 08/19/2023 102  98 - 107 mmol/L Final    CO2 08/19/2023 22.6  22.0 - 29.0 mmol/L Final    Calcium 08/19/2023 8.8  8.6 - 10.5 mg/dL Final    BUN/Creatinine Ratio 08/19/2023 30.1 (H)  7.0 - 25.0 Final    Anion Gap 08/19/2023 11.4  5.0 - 15.0 mmol/L Final    eGFR 08/19/2023 53.6 (L)  >60.0 mL/min/1.73 Final   Admission on 08/16/2023, Discharged on 08/16/2023   Component Date Value Ref Range Status    Glucose 08/16/2023 109 (H)  65 - 99 mg/dL Final    BUN 08/16/2023 39 (H)  6 - 20 mg/dL Final    Creatinine 08/16/2023 1.46 (H)  0.57 - 1.00 mg/dL Final    Sodium 08/16/2023 136  136 - 145 mmol/L Final    Potassium 08/16/2023 3.7  3.5 - 5.2 mmol/L Final    Chloride 08/16/2023 103  98 - 107 mmol/L Final    CO2 08/16/2023 18.1 (L)  22.0 - 29.0 mmol/L Final    Calcium 08/16/2023 8.8  8.6 - 10.5 mg/dL Final    Total Protein 08/16/2023 7.9  6.0 - 8.5 g/dL Final    Albumin 08/16/2023 3.9  3.5 - 5.2 g/dL Final    ALT (SGPT) 08/16/2023 21  1 - 33 U/L Final    AST (SGOT) 08/16/2023 26  1 - 32 U/L Final    Alkaline Phosphatase 08/16/2023 100  39 - 117 U/L Final    Total Bilirubin 08/16/2023 0.4  0.0 - 1.2 mg/dL Final    Globulin 08/16/2023 4.0  gm/dL Final    A/G Ratio 08/16/2023 1.0  g/dL Final    BUN/Creatinine Ratio 08/16/2023 26.7 (H)  7.0 - 25.0 Final    Anion Gap 08/16/2023 14.9  5.0 - 15.0 mmol/L Final    eGFR 08/16/2023 43.7 (L)  >60.0 mL/min/1.73 Final    Color, UA 08/16/2023 Yellow  Yellow, Straw Final    Appearance, UA 08/16/2023 Cloudy (A)  Clear Final    pH, UA  08/16/2023 6.5  5.0 - 8.0 Final    Specific Gravity, UA 08/16/2023 1.006  1.005 - 1.030 Final    Glucose, UA 08/16/2023 250 mg/dL (1+) (A)  Negative Final    Ketones, UA 08/16/2023 Negative  Negative Final    Bilirubin, UA 08/16/2023 Negative  Negative Final    Blood, UA 08/16/2023 Negative  Negative Final    Protein, UA 08/16/2023 Trace (A)  Negative Final    Leuk Esterase, UA 08/16/2023 Trace (A)  Negative Final    Nitrite, UA 08/16/2023 Negative  Negative Final    Urobilinogen, UA 08/16/2023 0.2 E.U./dL  0.2 - 1.0 E.U./dL Final    Ethanol 08/16/2023 <10  0 - 10 mg/dL Final    Ethanol % 08/16/2023 <0.010  % Final    THC, Screen, Urine 08/16/2023 Negative  Negative Final    Phencyclidine (PCP), Urine 08/16/2023 Negative  Negative Final    Cocaine Screen, Urine 08/16/2023 Negative  Negative Final    Methamphetamine, Ur 08/16/2023 Positive (A)  Negative Final    Opiate Screen 08/16/2023 Negative  Negative Final    Amphetamine Screen, Urine 08/16/2023 Negative  Negative Final    Benzodiazepine Screen, Urine 08/16/2023 Negative  Negative Final    Tricyclic Antidepressants Screen 08/16/2023 Negative  Negative Final    Methadone Screen, Urine 08/16/2023 Negative  Negative Final    Barbiturates Screen, Urine 08/16/2023 Negative  Negative Final    Oxycodone Screen, Urine 08/16/2023 Negative  Negative Final    Propoxyphene Screen 08/16/2023 Negative  Negative Final    Buprenorphine, Screen, Urine 08/16/2023 Negative  Negative Final    Magnesium 08/16/2023 2.0  1.6 - 2.6 mg/dL Final    COVID19 08/16/2023 Not Detected  Not Detected - Ref. Range Final    Influenza A PCR 08/16/2023 Not Detected  Not Detected Final    Influenza B PCR 08/16/2023 Not Detected  Not Detected Final    WBC 08/16/2023 11.93 (H)  3.40 - 10.80 10*3/mm3 Final    RBC 08/16/2023 4.04  3.77 - 5.28 10*6/mm3 Final    Hemoglobin 08/16/2023 11.0 (L)  12.0 - 15.9 g/dL Final    Hematocrit 08/16/2023 34.0  34.0 - 46.6 % Final    MCV 08/16/2023 84.2  79.0 - 97.0 fL  Final    MCH 08/16/2023 27.2  26.6 - 33.0 pg Final    MCHC 08/16/2023 32.4  31.5 - 35.7 g/dL Final    RDW 08/16/2023 14.8  12.3 - 15.4 % Final    RDW-SD 08/16/2023 45.0  37.0 - 54.0 fl Final    MPV 08/16/2023 10.0  6.0 - 12.0 fL Final    Platelets 08/16/2023 309  140 - 450 10*3/mm3 Final    Neutrophil % 08/16/2023 68.9  42.7 - 76.0 % Final    Lymphocyte % 08/16/2023 23.6  19.6 - 45.3 % Final    Monocyte % 08/16/2023 4.8 (L)  5.0 - 12.0 % Final    Eosinophil % 08/16/2023 1.3  0.3 - 6.2 % Final    Basophil % 08/16/2023 0.8  0.0 - 1.5 % Final    Immature Grans % 08/16/2023 0.6 (H)  0.0 - 0.5 % Final    Neutrophils, Absolute 08/16/2023 8.23 (H)  1.70 - 7.00 10*3/mm3 Final    Lymphocytes, Absolute 08/16/2023 2.81  0.70 - 3.10 10*3/mm3 Final    Monocytes, Absolute 08/16/2023 0.57  0.10 - 0.90 10*3/mm3 Final    Eosinophils, Absolute 08/16/2023 0.16  0.00 - 0.40 10*3/mm3 Final    Basophils, Absolute 08/16/2023 0.09  0.00 - 0.20 10*3/mm3 Final    Immature Grans, Absolute 08/16/2023 0.07 (H)  0.00 - 0.05 10*3/mm3 Final    nRBC 08/16/2023 0.0  0.0 - 0.2 /100 WBC Final    Extra Tube 08/16/2023 Hold for add-ons.   Final    Auto resulted.    Extra Tube 08/16/2023 hold for add-on   Final    Auto resulted    Extra Tube 08/16/2023 Hold for add-ons.   Final    Auto resulted.    Extra Tube 08/16/2023 Hold for add-ons.   Final    Auto resulted    Fentanyl, Urine 08/16/2023 Positive (A)  Negative Final    RBC, UA 08/16/2023 0-2  None Seen, 0-2 /HPF Final    WBC, UA 08/16/2023 3-5 (A)  None Seen, 0-2 /HPF Final    Bacteria, UA 08/16/2023 None Seen  None Seen /HPF Final    Squamous Epithelial Cells, UA 08/16/2023 0-2  None Seen, 0-2 /HPF Final    Hyaline Casts, UA 08/16/2023 None Seen  None Seen /LPF Final    Methodology 08/16/2023 Automated Microscopy   Final        Condition on Discharge:  improved    Vital Signs  Temp:  [96.9 °F (36.1 °C)-98 °F (36.7 °C)] 96.9 °F (36.1 °C)  Heart Rate:  [78-98] 83  Resp:  [16-18] 16  BP:  (120146)/() 146/110      Discharge Disposition:  Home or Self Care    Discharge Medications:     Discharge Medications        New Medications        Instructions Start Date   amLODIPine 10 MG tablet  Commonly known as: NORVASC   10 mg, Oral, Every 24 Hours Scheduled   Start Date: August 22, 2023     metoprolol tartrate 25 MG tablet  Commonly known as: LOPRESSOR   25 mg, Oral, 2 Times Daily               Discharge Diet:    Diet Instructions    Advance as tolerated           Activity at Discharge:    Activity Instructions    As tolerated           Follow-up Appointments    [facility]      Time spent in discharge: < 30 min    Clinician:   Quiana Stewart MD  08/21/23  13:10 EDT